# Patient Record
Sex: MALE | URBAN - METROPOLITAN AREA
[De-identification: names, ages, dates, MRNs, and addresses within clinical notes are randomized per-mention and may not be internally consistent; named-entity substitution may affect disease eponyms.]

---

## 2018-08-25 ENCOUNTER — APPOINTMENT (RX ONLY)
Dept: URBAN - METROPOLITAN AREA CLINIC 15 | Facility: CLINIC | Age: 48
Setting detail: DERMATOLOGY
End: 2018-08-25

## 2018-08-25 DIAGNOSIS — L81.4 OTHER MELANIN HYPERPIGMENTATION: ICD-10-CM

## 2018-08-25 DIAGNOSIS — B36.0 PITYRIASIS VERSICOLOR: ICD-10-CM

## 2018-08-25 DIAGNOSIS — L85.3 XEROSIS CUTIS: ICD-10-CM

## 2018-08-25 PROBLEM — I10 ESSENTIAL (PRIMARY) HYPERTENSION: Status: ACTIVE | Noted: 2018-08-25

## 2018-08-25 PROCEDURE — ? PRESCRIPTION

## 2018-08-25 PROCEDURE — ? COUNSELING

## 2018-08-25 PROCEDURE — ? SUNSCREEN RECOMMENDATIONS

## 2018-08-25 PROCEDURE — ? TREATMENT REGIMEN

## 2018-08-25 PROCEDURE — 99203 OFFICE O/P NEW LOW 30 MIN: CPT

## 2018-08-25 PROCEDURE — ? PATIENT SPECIFIC COUNSELING

## 2018-08-25 RX ORDER — KETOCONAZOLE 20 MG/G
CREAM TOPICAL
Qty: 1 | Refills: 2 | Status: ERX | COMMUNITY
Start: 2018-08-25

## 2018-08-25 RX ORDER — FLUCONAZOLE 150 MG/1
TABLET ORAL
Qty: 8 | Refills: 0 | Status: ERX | COMMUNITY
Start: 2018-08-25

## 2018-08-25 RX ADMIN — KETOCONAZOLE: 20 CREAM TOPICAL at 00:00

## 2018-08-25 RX ADMIN — FLUCONAZOLE: 150 TABLET ORAL at 00:00

## 2018-08-25 ASSESSMENT — LOCATION ZONE DERM
LOCATION ZONE: TRUNK
LOCATION ZONE: LEG
LOCATION ZONE: ARM

## 2018-08-25 ASSESSMENT — LOCATION SIMPLE DESCRIPTION DERM
LOCATION SIMPLE: LEFT UPPER BACK
LOCATION SIMPLE: LEFT UPPER ARM
LOCATION SIMPLE: RIGHT UPPER BACK
LOCATION SIMPLE: LEFT SHOULDER
LOCATION SIMPLE: RIGHT UPPER ARM
LOCATION SIMPLE: LEFT PRETIBIAL REGION
LOCATION SIMPLE: RIGHT PRETIBIAL REGION

## 2018-08-25 ASSESSMENT — LOCATION DETAILED DESCRIPTION DERM
LOCATION DETAILED: RIGHT PROXIMAL PRETIBIAL REGION
LOCATION DETAILED: LEFT SUPERIOR MEDIAL UPPER BACK
LOCATION DETAILED: LEFT PROXIMAL POSTERIOR UPPER ARM
LOCATION DETAILED: LEFT PROXIMAL PRETIBIAL REGION
LOCATION DETAILED: LEFT POSTERIOR SHOULDER
LOCATION DETAILED: RIGHT SUPERIOR MEDIAL UPPER BACK
LOCATION DETAILED: RIGHT PROXIMAL POSTERIOR UPPER ARM

## 2018-08-25 ASSESSMENT — SEVERITY ASSESSMENT: SEVERITY: MILD

## 2018-08-25 NOTE — PROCEDURE: PATIENT SPECIFIC COUNSELING
Discussed with patient the importance use of creams to help keep the skin well hydrated.
Detail Level: Zone
Explained to patient that the condition is secondary to excessive sweating while being exposed to the sun or from working out. Will start patient on a treatment plan.

## 2018-08-25 NOTE — PROCEDURE: TREATMENT REGIMEN
Initiate Treatment: Diflucan 150 mg take one pill weekly for total of 8 weeks\\nSelsun blue shampoo to wash trunk daily for 4 months\\nKetoconazole 2% Cream Apply to legs twice daily for two weeks
Detail Level: Zone
Otc Regimen: CeraVe Cream once daily after showers

## 2018-08-25 NOTE — PROCEDURE: SUNSCREEN RECOMMENDATIONS
Detail Level: Zone
Products Recommended: SPF daily that has the zinc oxide and titanium dioxide
General Sunscreen Counseling: I recommended a broad spectrum sunscreen with a SPF of 30 or higher. I explained that SPF 30 sunscreens block approximately 97 percent of the sun's harmful rays. Sunscreens should be applied at least 15 minutes prior to expected sun exposure and then every 2 hours after that as long as sun exposure continues. If swimming or exercising sunscreen should be reapplied every 45 minutes to an hour after getting wet or sweating. One ounce, or the equivalent of a shot glass full of sunscreen, is adequate to protect the skin not covered by a bathing suit. I also recommended a lip balm with a sunscreen as well. Sun protective clothing can be used in lieu of sunscreen but must be worn the entire time you are exposed to the sun's rays.

## 2018-08-25 NOTE — HPI: RASH
How Severe Is Your Rash?: mild
Is This A New Presentation, Or A Follow-Up?: Rash
Additional History: He is here for evaluation and management.

## 2019-01-12 ENCOUNTER — APPOINTMENT (RX ONLY)
Dept: URBAN - METROPOLITAN AREA CLINIC 15 | Facility: CLINIC | Age: 49
Setting detail: DERMATOLOGY
End: 2019-01-12

## 2019-01-12 DIAGNOSIS — L85.3 XEROSIS CUTIS: ICD-10-CM

## 2019-01-12 DIAGNOSIS — B36.0 PITYRIASIS VERSICOLOR: ICD-10-CM

## 2019-01-12 DIAGNOSIS — L81.4 OTHER MELANIN HYPERPIGMENTATION: ICD-10-CM

## 2019-01-12 PROCEDURE — ? PRESCRIPTION

## 2019-01-12 PROCEDURE — ? TREATMENT REGIMEN

## 2019-01-12 PROCEDURE — ? COUNSELING

## 2019-01-12 PROCEDURE — 99213 OFFICE O/P EST LOW 20 MIN: CPT

## 2019-01-12 PROCEDURE — ? SUNSCREEN RECOMMENDATIONS

## 2019-01-12 PROCEDURE — ? PATIENT SPECIFIC COUNSELING

## 2019-01-12 RX ORDER — CLOTRIMAZOLE 10 MG/G
CREAM TOPICAL
Qty: 1 | Refills: 4 | Status: ERX | COMMUNITY
Start: 2019-01-12

## 2019-01-12 RX ORDER — KETOCONAZOLE 20.5 MG/ML
SHAMPOO, SUSPENSION TOPICAL
Qty: 1 | Refills: 4 | Status: ERX | COMMUNITY
Start: 2019-01-12

## 2019-01-12 RX ADMIN — CLOTRIMAZOLE: 10 CREAM TOPICAL at 00:00

## 2019-01-12 RX ADMIN — KETOCONAZOLE: 20.5 SHAMPOO, SUSPENSION TOPICAL at 00:00

## 2019-01-12 ASSESSMENT — LOCATION SIMPLE DESCRIPTION DERM
LOCATION SIMPLE: LEFT PRETIBIAL REGION
LOCATION SIMPLE: LEFT UPPER ARM
LOCATION SIMPLE: RIGHT UPPER BACK
LOCATION SIMPLE: LEFT UPPER BACK
LOCATION SIMPLE: RIGHT UPPER ARM
LOCATION SIMPLE: RIGHT PRETIBIAL REGION
LOCATION SIMPLE: LEFT SHOULDER

## 2019-01-12 ASSESSMENT — LOCATION DETAILED DESCRIPTION DERM
LOCATION DETAILED: RIGHT SUPERIOR MEDIAL UPPER BACK
LOCATION DETAILED: RIGHT PROXIMAL PRETIBIAL REGION
LOCATION DETAILED: LEFT PROXIMAL POSTERIOR UPPER ARM
LOCATION DETAILED: RIGHT PROXIMAL POSTERIOR UPPER ARM
LOCATION DETAILED: LEFT POSTERIOR SHOULDER
LOCATION DETAILED: LEFT PROXIMAL PRETIBIAL REGION
LOCATION DETAILED: LEFT SUPERIOR MEDIAL UPPER BACK

## 2019-01-12 ASSESSMENT — LOCATION ZONE DERM
LOCATION ZONE: LEG
LOCATION ZONE: TRUNK
LOCATION ZONE: ARM

## 2019-01-12 NOTE — PROCEDURE: SUNSCREEN RECOMMENDATIONS

## 2019-01-12 NOTE — PROCEDURE: PATIENT SPECIFIC COUNSELING
Explained to patient that the condition is secondary to excessive sweating while being exposed to the sun or from working out. Will start patient on a treatment plan.
Detail Level: Zone
Discussed with patient the importance use of creams to help keep the skin well hydrated.

## 2019-08-24 ENCOUNTER — APPOINTMENT (RX ONLY)
Dept: URBAN - METROPOLITAN AREA CLINIC 15 | Facility: CLINIC | Age: 49
Setting detail: DERMATOLOGY
End: 2019-08-24

## 2019-08-24 ENCOUNTER — RX ONLY (OUTPATIENT)
Age: 49
Setting detail: RX ONLY
End: 2019-08-24

## 2019-08-24 DIAGNOSIS — L85.3 XEROSIS CUTIS: ICD-10-CM

## 2019-08-24 DIAGNOSIS — B36.0 PITYRIASIS VERSICOLOR: ICD-10-CM

## 2019-08-24 PROCEDURE — ? TREATMENT REGIMEN

## 2019-08-24 PROCEDURE — 99213 OFFICE O/P EST LOW 20 MIN: CPT

## 2019-08-24 PROCEDURE — ? COUNSELING

## 2019-08-24 PROCEDURE — ? PRESCRIPTION

## 2019-08-24 PROCEDURE — ? PATIENT SPECIFIC COUNSELING

## 2019-08-24 RX ORDER — KETOCONAZOLE 20.5 MG/ML
SHAMPOO, SUSPENSION TOPICAL
Qty: 1 | Refills: 4 | Status: ERX

## 2019-08-24 RX ORDER — OXICONAZOLE NITRATE 10 MG/G
CREAM TOPICAL
Qty: 1 | Refills: 2 | Status: ACTIVE

## 2019-08-24 NOTE — PROCEDURE: PATIENT SPECIFIC COUNSELING
Explained to patient that the condition is secondary to excessive sweating while being exposed to the sun or from working out. Will start patient on a treatment plan.
Detail Level: Zone

## 2019-08-24 NOTE — PROCEDURE: TREATMENT REGIMEN
Otc Regimen: .\\nZeasorb powder
Detail Level: Simple
Continue Regimen: .\\nKetoconazole shampoo 2 %
Otc Regimen: .\\nCeraVe cream daily after showers

## 2019-08-24 NOTE — PROCEDURE: MIPS QUALITY
Quality 131: Pain Assessment And Follow-Up: Pain assessment using a standardized tool is documented as negative, no follow-up plan required
Quality 130: Documentation Of Current Medications In The Medical Record: Current Medications Documented
Quality 110: Preventive Care And Screening: Influenza Immunization: Influenza immunization was not ordered or administered, reason not given
Additional Notes: No vaccines, pain 0/10
Quality 474: Zoster Vaccination Status: Shingrix Vaccination not Administered or Previously Received, Reason not Otherwise Specified
Detail Level: Detailed

## 2019-08-26 ENCOUNTER — RX ONLY (OUTPATIENT)
Age: 49
Setting detail: RX ONLY
End: 2019-08-26

## 2019-08-26 RX ORDER — OXICONAZOLE NITRATE 10 MG/G
CREAM TOPICAL
Qty: 1 | Refills: 2 | Status: ERX | COMMUNITY
Start: 2019-08-26

## 2019-09-05 ENCOUNTER — RX ONLY (OUTPATIENT)
Age: 49
Setting detail: RX ONLY
End: 2019-09-05

## 2019-09-05 RX ORDER — KETOCONAZOLE 20 MG/G
CREAM TOPICAL
Qty: 1 | Refills: 2 | Status: ERX

## 2019-12-21 ENCOUNTER — APPOINTMENT (RX ONLY)
Dept: URBAN - METROPOLITAN AREA CLINIC 15 | Facility: CLINIC | Age: 49
Setting detail: DERMATOLOGY
End: 2019-12-21

## 2019-12-21 DIAGNOSIS — B36.0 PITYRIASIS VERSICOLOR: ICD-10-CM

## 2019-12-21 DIAGNOSIS — L85.3 XEROSIS CUTIS: ICD-10-CM

## 2019-12-21 PROCEDURE — ? PATIENT SPECIFIC COUNSELING

## 2019-12-21 PROCEDURE — ? PRESCRIPTION

## 2019-12-21 PROCEDURE — 99213 OFFICE O/P EST LOW 20 MIN: CPT

## 2019-12-21 PROCEDURE — ? COUNSELING

## 2019-12-21 PROCEDURE — ? TREATMENT REGIMEN

## 2019-12-21 RX ORDER — KETOCONAZOLE 20 MG/G
CREAM TOPICAL
Qty: 1 | Refills: 2 | Status: ERX | COMMUNITY
Start: 2019-12-21

## 2019-12-21 RX ADMIN — KETOCONAZOLE: 20 CREAM TOPICAL at 00:00

## 2019-12-21 ASSESSMENT — SEVERITY ASSESSMENT: SEVERITY: MILD

## 2019-12-21 NOTE — PROCEDURE: TREATMENT REGIMEN
Initiate Treatment: .\\n- Selsun blue shampoo wash affected areas from neck to arms daily for 6 months\\n- Ketoconazole 2% cream Apply to affected areas from neck to arms twice daily for three weeks then discontinue and apply CeraVe moisturizing cream for three weeks then restart Ketoconazole 2% cream again and repeat routine for total of 6 months
Detail Level: Zone
Discontinue Regimen: .\\n- Ketoconazole 2% shampoo \\n- oxiconazole cream
Otc Regimen: .\\nCeraVe cream daily after showers
Detail Level: Simple

## 2019-12-21 NOTE — PROCEDURE: PATIENT SPECIFIC COUNSELING
Explained to patient that the condition is secondary to excessive sweating while being exposed to the sun or from working out. Will make some changes to the regimen since he was not seeing much improvement.
Detail Level: Zone

## 2019-12-21 NOTE — PROCEDURE: MIPS QUALITY
Additional Notes: Patient states pain as negative, and on a scale of 0-10 the pain level is 0/10.
Quality 131: Pain Assessment And Follow-Up: Pain assessment using a standardized tool is documented as negative, no follow-up plan required
Quality 130: Documentation Of Current Medications In The Medical Record: Current Medications Documented
Detail Level: Detailed
Statement Selected

## 2020-03-14 ENCOUNTER — APPOINTMENT (RX ONLY)
Dept: URBAN - METROPOLITAN AREA CLINIC 15 | Facility: CLINIC | Age: 50
Setting detail: DERMATOLOGY
End: 2020-03-14

## 2020-03-14 DIAGNOSIS — B36.0 PITYRIASIS VERSICOLOR: ICD-10-CM | Status: WELL CONTROLLED

## 2020-03-14 PROCEDURE — 99213 OFFICE O/P EST LOW 20 MIN: CPT

## 2020-03-14 PROCEDURE — ? COUNSELING

## 2020-03-14 PROCEDURE — ? PATIENT SPECIFIC COUNSELING

## 2020-03-14 PROCEDURE — ? TREATMENT REGIMEN

## 2020-03-14 ASSESSMENT — SEVERITY ASSESSMENT: SEVERITY: ALMOST CLEAR

## 2020-03-14 NOTE — PROCEDURE: TREATMENT REGIMEN
Detail Level: Zone
Continue Regimen: .\\n- Selsun blue shampoo wash affected areas from neck to arms 3 x week \\n- CeraVe moisturizing cream Apply to body daily as maintenance \\n\\n- Ecoza Foam samples given to apply to affected areas on trunk and arms twice a week to prevent the flare

## 2020-03-14 NOTE — PROCEDURE: PATIENT SPECIFIC COUNSELING
The condition has improved with the treatment plan. Will make some changes to the current regimen.
Detail Level: Zone

## 2021-09-30 ENCOUNTER — APPOINTMENT (RX ONLY)
Dept: URBAN - METROPOLITAN AREA CLINIC 15 | Facility: CLINIC | Age: 51
Setting detail: DERMATOLOGY
End: 2021-09-30

## 2021-09-30 VITALS — HEIGHT: 70 IN | WEIGHT: 160 LBS

## 2021-09-30 DIAGNOSIS — B36.0 PITYRIASIS VERSICOLOR: ICD-10-CM | Status: WORSENING

## 2021-09-30 PROCEDURE — ? PATIENT SPECIFIC COUNSELING

## 2021-09-30 PROCEDURE — ? PRESCRIPTION

## 2021-09-30 PROCEDURE — ? TREATMENT REGIMEN

## 2021-09-30 PROCEDURE — ? COUNSELING

## 2021-09-30 PROCEDURE — 99214 OFFICE O/P EST MOD 30 MIN: CPT

## 2021-09-30 RX ORDER — SULCONAZOLE NITRATE 10 MG/G
CREAM TOPICAL BID
Qty: 60 | Refills: 1 | Status: ERX | COMMUNITY
Start: 2021-09-30

## 2021-09-30 RX ORDER — SULCONAZOLE NITRATE 10 MG/G
CREAM TOPICAL BID
Qty: 60 | Refills: 1 | Status: CANCELLED | COMMUNITY
Start: 2021-09-30

## 2021-09-30 RX ORDER — SULCONAZOLE NITRATE 10 MG/G
CREAM TOPICAL BID
Qty: 60 | Refills: 1 | Status: ERX

## 2021-09-30 RX ADMIN — SULCONAZOLE NITRATE: 10 CREAM TOPICAL at 00:00

## 2021-09-30 ASSESSMENT — LOCATION ZONE DERM: LOCATION ZONE: ARM

## 2021-09-30 ASSESSMENT — LOCATION SIMPLE DESCRIPTION DERM
LOCATION SIMPLE: LEFT SHOULDER
LOCATION SIMPLE: RIGHT SHOULDER

## 2021-09-30 ASSESSMENT — LOCATION DETAILED DESCRIPTION DERM
LOCATION DETAILED: LEFT ANTERIOR SHOULDER
LOCATION DETAILED: RIGHT ANTERIOR SHOULDER

## 2021-09-30 NOTE — PROCEDURE: TREATMENT REGIMEN
Detail Level: Zone
Continue Regimen: .\\n- Applt exelderm twice a day for 8 weeks. \\n- Selsun blue shampoo wash affected areas from neck to arms 3 x week \\n- CeraVe moisturizing cream Apply to body daily as maintenance
